# Patient Record
Sex: MALE | Race: WHITE | NOT HISPANIC OR LATINO | Employment: UNEMPLOYED | URBAN - METROPOLITAN AREA
[De-identification: names, ages, dates, MRNs, and addresses within clinical notes are randomized per-mention and may not be internally consistent; named-entity substitution may affect disease eponyms.]

---

## 2020-06-14 ENCOUNTER — HOSPITAL ENCOUNTER (EMERGENCY)
Facility: HOSPITAL | Age: 25
Discharge: HOME | End: 2020-06-14
Attending: EMERGENCY MEDICINE | Admitting: EMERGENCY MEDICINE

## 2020-06-14 ENCOUNTER — APPOINTMENT (OUTPATIENT)
Dept: GENERAL RADIOLOGY | Facility: HOSPITAL | Age: 25
End: 2020-06-14

## 2020-06-14 VITALS
DIASTOLIC BLOOD PRESSURE: 68 MMHG | HEART RATE: 75 BPM | BODY MASS INDEX: 19.29 KG/M2 | TEMPERATURE: 98.9 F | RESPIRATION RATE: 16 BRPM | SYSTOLIC BLOOD PRESSURE: 107 MMHG | WEIGHT: 120 LBS | OXYGEN SATURATION: 97 % | HEIGHT: 66 IN

## 2020-06-14 DIAGNOSIS — R09.1 PLEURISY: Primary | ICD-10-CM

## 2020-06-14 LAB
ALBUMIN SERPL-MCNC: 4.6 G/DL (ref 3.5–5.2)
ALBUMIN/GLOB SERPL: 1.9 G/DL
ALP SERPL-CCNC: 56 U/L (ref 39–117)
ALT SERPL W P-5'-P-CCNC: 12 U/L (ref 1–41)
ANION GAP SERPL CALCULATED.3IONS-SCNC: 10.6 MMOL/L (ref 5–15)
AST SERPL-CCNC: 17 U/L (ref 1–40)
BASOPHILS # BLD AUTO: 0.09 10*3/MM3 (ref 0–0.2)
BASOPHILS NFR BLD AUTO: 1.1 % (ref 0–1.5)
BILIRUB SERPL-MCNC: 0.2 MG/DL (ref 0.2–1.2)
BUN BLD-MCNC: 10 MG/DL (ref 6–20)
BUN/CREAT SERPL: 11.8 (ref 7–25)
CALCIUM SPEC-SCNC: 9.2 MG/DL (ref 8.6–10.5)
CHLORIDE SERPL-SCNC: 102 MMOL/L (ref 98–107)
CO2 SERPL-SCNC: 28.4 MMOL/L (ref 22–29)
CREAT BLD-MCNC: 0.85 MG/DL (ref 0.76–1.27)
D DIMER PPP FEU-MCNC: <0.27 MCGFEU/ML (ref 0–0.46)
DEPRECATED RDW RBC AUTO: 41.1 FL (ref 37–54)
EOSINOPHIL # BLD AUTO: 0.25 10*3/MM3 (ref 0–0.4)
EOSINOPHIL NFR BLD AUTO: 3.1 % (ref 0.3–6.2)
ERYTHROCYTE [DISTWIDTH] IN BLOOD BY AUTOMATED COUNT: 13.1 % (ref 12.3–15.4)
GFR SERPL CREATININE-BSD FRML MDRD: 110 ML/MIN/1.73
GLOBULIN UR ELPH-MCNC: 2.4 GM/DL
GLUCOSE BLD-MCNC: 100 MG/DL (ref 65–99)
HCT VFR BLD AUTO: 39.7 % (ref 37.5–51)
HGB BLD-MCNC: 12.9 G/DL (ref 13–17.7)
IMM GRANULOCYTES # BLD AUTO: 0.03 10*3/MM3 (ref 0–0.05)
IMM GRANULOCYTES NFR BLD AUTO: 0.4 % (ref 0–0.5)
LYMPHOCYTES # BLD AUTO: 3.29 10*3/MM3 (ref 0.7–3.1)
LYMPHOCYTES NFR BLD AUTO: 40.6 % (ref 19.6–45.3)
MCH RBC QN AUTO: 28.2 PG (ref 26.6–33)
MCHC RBC AUTO-ENTMCNC: 32.5 G/DL (ref 31.5–35.7)
MCV RBC AUTO: 86.9 FL (ref 79–97)
MONOCYTES # BLD AUTO: 0.88 10*3/MM3 (ref 0.1–0.9)
MONOCYTES NFR BLD AUTO: 10.9 % (ref 5–12)
NEUTROPHILS # BLD AUTO: 3.57 10*3/MM3 (ref 1.7–7)
NEUTROPHILS NFR BLD AUTO: 43.9 % (ref 42.7–76)
NRBC BLD AUTO-RTO: 0 /100 WBC (ref 0–0.2)
PLATELET # BLD AUTO: 197 10*3/MM3 (ref 140–450)
PMV BLD AUTO: 10.1 FL (ref 6–12)
POTASSIUM BLD-SCNC: 4 MMOL/L (ref 3.5–5.2)
PROT SERPL-MCNC: 7 G/DL (ref 6–8.5)
RBC # BLD AUTO: 4.57 10*6/MM3 (ref 4.14–5.8)
SODIUM BLD-SCNC: 141 MMOL/L (ref 136–145)
TROPONIN T SERPL-MCNC: <0.01 NG/ML (ref 0–0.03)
VALPROATE SERPL-MCNC: 41.4 MCG/ML (ref 50–125)
WBC NRBC COR # BLD: 8.11 10*3/MM3 (ref 3.4–10.8)

## 2020-06-14 PROCEDURE — 84484 ASSAY OF TROPONIN QUANT: CPT | Performed by: EMERGENCY MEDICINE

## 2020-06-14 PROCEDURE — 80164 ASSAY DIPROPYLACETIC ACD TOT: CPT | Performed by: EMERGENCY MEDICINE

## 2020-06-14 PROCEDURE — 99284 EMERGENCY DEPT VISIT MOD MDM: CPT | Performed by: EMERGENCY MEDICINE

## 2020-06-14 PROCEDURE — 85379 FIBRIN DEGRADATION QUANT: CPT | Performed by: EMERGENCY MEDICINE

## 2020-06-14 PROCEDURE — 71046 X-RAY EXAM CHEST 2 VIEWS: CPT

## 2020-06-14 PROCEDURE — 80053 COMPREHEN METABOLIC PANEL: CPT | Performed by: EMERGENCY MEDICINE

## 2020-06-14 PROCEDURE — 99283 EMERGENCY DEPT VISIT LOW MDM: CPT

## 2020-06-14 PROCEDURE — 93005 ELECTROCARDIOGRAM TRACING: CPT | Performed by: EMERGENCY MEDICINE

## 2020-06-14 PROCEDURE — 85025 COMPLETE CBC W/AUTO DIFF WBC: CPT | Performed by: EMERGENCY MEDICINE

## 2020-06-14 PROCEDURE — 93010 ELECTROCARDIOGRAM REPORT: CPT | Performed by: INTERNAL MEDICINE

## 2020-06-14 RX ORDER — TRAZODONE HYDROCHLORIDE 50 MG/1
50 TABLET ORAL NIGHTLY
COMMUNITY

## 2020-06-14 RX ORDER — DIVALPROEX SODIUM 500 MG/1
500 TABLET, DELAYED RELEASE ORAL NIGHTLY
COMMUNITY
End: 2020-07-16

## 2020-06-14 RX ORDER — SODIUM CHLORIDE 0.9 % (FLUSH) 0.9 %
10 SYRINGE (ML) INJECTION AS NEEDED
Status: DISCONTINUED | OUTPATIENT
Start: 2020-06-14 | End: 2020-06-14 | Stop reason: HOSPADM

## 2020-06-15 NOTE — ED NOTES
Patient eploed and in parking lot with IV in place.  When asked pt if he had an IV he said no.  I asked him to pull up his sleeves and there was an IV in his rt AC.  I escorted pt into the emergency room and removed his IV.     Tiffanie Emery  06/14/20 6837

## 2020-06-15 NOTE — ED PROVIDER NOTES
Subjective     History provided by:  Patient    History of Present Illness    · Chief complaint: Chest pain    · Location: Substernal nonradiating    · Quality/Severity: Sharp and throbbing and moderately intense.    · Timing/Onset: Started 4 days ago.    · Modifying Factors: Breathing exacerbates the pain.  Applying pressure to his chest wall improves the pain.    · Associated symptoms: He states that it hurts to breathe and he short of breath.    · Narrative: The patient is a 25-year-old white male complaining of substernal chest pain for 4 days.  The patient states the [facility] placed him on psychiatric medications Desyrel,  Zoloft and Depakote which he believes is causing the chest discomfort.  He is unemployed and smokes a pack per day.  He denies a history of IV drug abuse.  He states he only drug he is never abused is been marijuana remotely.    Review of Systems   Constitutional: Negative for activity change, appetite change, chills, diaphoresis, fatigue and fever.   HENT: Negative for congestion, dental problem, ear pain, hearing loss, mouth sores, postnasal drip, rhinorrhea, sinus pressure, sore throat and voice change.    Eyes: Negative for photophobia, pain, discharge, redness and visual disturbance.   Respiratory: Positive for shortness of breath. Negative for cough, chest tightness, wheezing and stridor.    Cardiovascular: Positive for chest pain. Negative for palpitations and leg swelling.   Gastrointestinal: Negative for abdominal pain, diarrhea, nausea and vomiting.   Genitourinary: Negative for difficulty urinating, dysuria, flank pain, frequency, hematuria and urgency.   Musculoskeletal: Negative for arthralgias, back pain, gait problem, joint swelling, myalgias, neck pain and neck stiffness.   Skin: Negative for color change and rash.   Neurological: Negative for dizziness, tremors, seizures, syncope, facial asymmetry, speech difficulty, weakness, light-headedness, numbness and headaches.  "  Hematological: Negative for adenopathy.   Psychiatric/Behavioral: Negative.  Negative for confusion and decreased concentration. The patient is not nervous/anxious.      Past Medical History:   Diagnosis Date   • Suicidal ideation      /68 (BP Location: Left arm, Patient Position: Lying)   Pulse 75   Temp 98.9 °F (37.2 °C) (Oral)   Resp 16   Ht 167.6 cm (66\")   Wt 54.4 kg (120 lb)   SpO2 97%   BMI 19.37 kg/m²     Past Medical History:   Diagnosis Date   • Suicidal ideation        No Known Allergies    History reviewed. No pertinent surgical history.    History reviewed. No pertinent family history.    Social History     Socioeconomic History   • Marital status: Significant Other     Spouse name: Not on file   • Number of children: Not on file   • Years of education: Not on file   • Highest education level: Not on file   Tobacco Use   • Smoking status: Current Every Day Smoker     Packs/day: 0.50     Types: Cigarettes, Electronic Cigarette   • Smokeless tobacco: Never Used   Substance and Sexual Activity   • Alcohol use: Not Currently   • Drug use: Not Currently           Objective   Physical Exam   Constitutional: He is oriented to person, place, and time. He appears well-developed and well-nourished. No distress.   The patient has a thin body build.  He appears in no acute distress.  Review of his vital signs: He is afebrile and all his vital signs are within normal limits.   HENT:   Head: Normocephalic and atraumatic.   Nose: Nose normal.   Mouth/Throat: Oropharynx is clear and moist. No oropharyngeal exudate.   Eyes: Pupils are equal, round, and reactive to light. EOM are normal. Right eye exhibits no discharge. Left eye exhibits no discharge. No scleral icterus.   Neck: Normal range of motion. Neck supple. No JVD present. No thyromegaly present.   Cardiovascular: Normal rate, regular rhythm and normal heart sounds.   No murmur heard.  Pulmonary/Chest: Effort normal and breath sounds normal. He has " no wheezes. He has no rales. He exhibits no tenderness.   Applying pressure to his chest wall improves his chest discomfort.   Abdominal: Soft. Bowel sounds are normal. He exhibits no distension. There is no tenderness.   Musculoskeletal: Normal range of motion. He exhibits no edema, tenderness or deformity.   Lymphadenopathy:     He has no cervical adenopathy.   Neurological: He is alert and oriented to person, place, and time. No cranial nerve deficit. Coordination normal.   No focal motor sensory deficit   Skin: Skin is warm and dry. Capillary refill takes less than 2 seconds. No rash noted. He is not diaphoretic.   Psychiatric: He has a normal mood and affect. His behavior is normal. Judgment and thought content normal.   Nursing note and vitals reviewed.      Procedures           ED Course  ED Course as of Jun 14 2234   Sun Jun 14, 2020 2035 My interpretation of the patient's EKG tracing performed 20:23 is normal sinus rhythm with a rate of 63, normal axis, normal conduction, no acute ST segment elevation or depression consistent with ischemia, no ectopy, normal EKG.    [TP]   2110 The patient's chest x-ray was interpreted by me and the radiologist as normal.  Cardiac troponin negative.  D-dimer negative.  Valproic acid level is subtherapeutic at 41.4.  CBC was normal.  CMP was normal.    [TP]   2231 21:10 the patient walked out of the ER without telling anybody just before I was able to discuss with him his test results.  The tech ran after him and caught him in the parking lot and asked him if he still had an IV in place.  The patient told the tech he did not.  When she lifted the sleeve on his arm she found the IV still in place.  The patient was brought back to the emergency department where the IV was removed.  I informed him of his test results being negative for acute pathology.  Is my impression the patient's chest pain is due to pleurisy.  I counseled him to stop smoking.  He refused my offer for a  prescription for an NSAID.    [TP]      ED Course User Index  [TP] Pablito Giles MD                                           MDM  Number of Diagnoses or Management Options  Pleurisy: new and requires workup     Amount and/or Complexity of Data Reviewed  Clinical lab tests: ordered and reviewed  Tests in the radiology section of CPT®: ordered and reviewed  Tests in the medicine section of CPT®: ordered and reviewed  Independent visualization of images, tracings, or specimens: yes    Risk of Complications, Morbidity, and/or Mortality  Presenting problems: high  Diagnostic procedures: high  Management options: high  General comments: My differential diagnosis for chest pain includes but is not limited to:  Muscle strain, costochondritis, myositis, pleurisy, rib fracture, intercostal neuritis, herpes zoster, tumor, myocardial infarction, coronary syndrome, unstable angina, angina, aortic dissection, mitral valve prolapse, pericarditis, palpitations, pulmonary embolus, pneumonia, pneumothorax, lung cancer, GERD, esophagitis, esophageal spasm    Patient Progress  Patient progress: stable      Final diagnoses:   Pleurisy           Labs Reviewed   COMPREHENSIVE METABOLIC PANEL - Abnormal; Notable for the following components:       Result Value    Glucose 100 (*)     All other components within normal limits    Narrative:     GFR Normal >60  Chronic Kidney Disease <60  Kidney Failure <15     CBC WITH AUTO DIFFERENTIAL - Abnormal; Notable for the following components:    Hemoglobin 12.9 (*)     Lymphocytes, Absolute 3.29 (*)     All other components within normal limits   VALPROIC ACID LEVEL, TOTAL - Abnormal; Notable for the following components:    Valproic Acid 41.4 (*)     All other components within normal limits    Narrative:     Therapeutic Ranges for Valproic Acid    Epilepsy:       mcg/ml  Bipolar/[place]  up to 125 mcg/ml     D-DIMER, QUANTITATIVE - Normal    Narrative:     Can be elevated in, but is not  diagnostic for deep vein thrombosis (DVT) or pulmonary embolis (PE).  It is also elevated in other medical conditions.  Clinical correlation is required.  The negative cut-off value for the D-Dimer is 0.50 mcg FEU/mL for DVT and PE.     TROPONIN (IN-HOUSE) - Normal    Narrative:     Troponin T Reference Range:  <= 0.03 ng/mL-   Negative for AMI  >0.03 ng/mL-     Abnormal for myocardial necrosis.  Clinicians would have to utilize clinical acumen, EKG, Troponin and serial changes to determine if it is an Acute Myocardial Infarction or myocardial injury due to an underlying chronic condition.       Results may be falsely decreased if patient taking Biotin.     CBC AND DIFFERENTIAL    Narrative:     The following orders were created for panel order CBC & Differential.  Procedure                               Abnormality         Status                     ---------                               -----------         ------                     CBC Auto Differential[[id number]]        Abnormal            Final result                 Please view results for these tests on the individual orders.     XR Chest 2 View   ED Interpretation   No acute cardiopulmonary findings.      Signer Name: Manuel Marshall MD    Signed: 6/14/2020 8:48 PM    Workstation Name: [facility]"[email or web address]     [facility]      Final Result   No acute cardiopulmonary findings.      Signer Name: Manuel Marshall MD    Signed: 6/14/2020 8:48 PM    Workstation Name: [facility]"[email or web address]     [facility]             Medication List      No changes were made to your prescriptions during this visit.            Pablito Giles MD  06/14/20 1869

## 2020-07-16 ENCOUNTER — HOSPITAL ENCOUNTER (EMERGENCY)
Facility: HOSPITAL | Age: 25
Discharge: HOME | End: 2020-07-16
Attending: EMERGENCY MEDICINE | Admitting: EMERGENCY MEDICINE

## 2020-07-16 VITALS
WEIGHT: 120 LBS | DIASTOLIC BLOOD PRESSURE: 77 MMHG | HEART RATE: 86 BPM | HEIGHT: 66 IN | OXYGEN SATURATION: 97 % | TEMPERATURE: 98.9 F | RESPIRATION RATE: 16 BRPM | SYSTOLIC BLOOD PRESSURE: 113 MMHG | BODY MASS INDEX: 19.29 KG/M2

## 2020-07-16 DIAGNOSIS — K08.89 PAIN, DENTAL: Primary | ICD-10-CM

## 2020-07-16 PROCEDURE — 99283 EMERGENCY DEPT VISIT LOW MDM: CPT

## 2020-07-16 PROCEDURE — 99282 EMERGENCY DEPT VISIT SF MDM: CPT | Performed by: EMERGENCY MEDICINE

## 2020-07-16 RX ORDER — ESCITALOPRAM OXALATE 10 MG/1
10 TABLET ORAL DAILY
COMMUNITY

## 2020-07-16 RX ORDER — HYDROXYZINE HYDROCHLORIDE 25 MG/1
25 TABLET, FILM COATED ORAL EVERY 6 HOURS PRN
COMMUNITY

## 2020-07-16 RX ORDER — HYDROCODONE BITARTRATE AND ACETAMINOPHEN 5; 325 MG/1; MG/1
1 TABLET ORAL ONCE
Status: COMPLETED | OUTPATIENT
Start: 2020-07-16 | End: 2020-07-16

## 2020-07-16 RX ADMIN — HYDROCODONE BITARTRATE AND ACETAMINOPHEN 1 TABLET: 5; 325 TABLET ORAL at 00:56

## 2020-07-16 NOTE — ED NOTES
"Pt states that he \"went to the dentist to get teeth pulled, dentist broke 1 tooth and cut the other in half.\" Pt on antibiotics and takes NSAIDS for pain control.  Pt states \"the pain is so bad I cant stand it. I can feel the pain all the way down my neck\"     Alfredo Cifuentes  07/16/20 0007    "

## 2020-07-16 NOTE — ED PROVIDER NOTES
Subjective   History of Present Illness  History of Present Illness    Chief complaint: Toothache    Location: Left lower tooth    Quality/Severity: Moderate to severe dull aching pain    Timing/Duration: Present for 2 weeks, worsening tonight    Modifying Factors: Worse with eating    Narrative: This patient presents for evaluation of worsening pain to the left lower tooth area.  He apparently has been dealing with a toothache problem for 2 weeks now.  He went to the [facility] dental clinic a couple of weeks ago and according to the patient, they tried to work on his tooth but it caused a crack in the tooth.  They put him on amoxicillin antibiotic for 1 week which she has not completed.  Patient has been waiting for a referral to an endodontist to have the tooth pulled.  He has not heard anything from them yet.  Tonight, the pain was increasing so he came here.  He has not had any fevers.  He says the pain feels like it is radiating up towards the ear and into the neck area.  It hurts when he eats or tries to swallow.    Associated Symptoms: As above    Review of Systems   Constitutional: Negative for activity change, diaphoresis and fever.   HENT: Positive for dental problem. Negative for drooling, facial swelling, hearing loss, mouth sores, sore throat and voice change.    Respiratory: Negative for cough and shortness of breath.    Cardiovascular: Negative for chest pain.   Skin: Negative for color change and rash.   Neurological: Positive for headaches. Negative for syncope.   All other systems reviewed and are negative.      Past Medical History:   Diagnosis Date   • Suicidal ideation    • Suicide attempt (CMS/[facility])        No Known Allergies    History reviewed. No pertinent surgical history.    History reviewed. No pertinent family history.    Social History     Socioeconomic History   • Marital status: Significant Other     Spouse name: Not on file   • Number of children: Not on file   • Years of education:  Not on file   • Highest education level: Not on file   Tobacco Use   • Smoking status: Current Every Day Smoker     Packs/day: 1.00     Types: Cigarettes, Electronic Cigarette   • Smokeless tobacco: Never Used   Substance and Sexual Activity   • Alcohol use: Not Currently   • Drug use: Yes     Types: Marijuana     Comment: last time- 3 weeks ago   • Sexual activity: Defer       ED Triage Vitals [07/16/20 0004]   Temp Heart Rate Resp BP SpO2   98.9 °F (37.2 °C) 86 16 113/77 97 %      Temp src Heart Rate Source Patient Position BP Location FiO2 (%)   Oral Monitor Sitting Right arm --         Objective   Physical Exam   Constitutional: He is oriented to person, place, and time. He appears well-developed and well-nourished. No distress.   HENT:   Head: Normocephalic and atraumatic.   Right Ear: External ear normal.   Left Ear: External ear normal.   Nose: Nose normal.   Poor dentition noted.  The left lower first molar does indeed show clear and obvious signs of carious disease with a fracture component towards the lateral aspect.  This is very tender to manipulation.  The surrounding gingival structures are inflamed.  No bleeding notable.  No significant facial soft tissue swelling is evident.  No sublingual swelling is evident either.  No facial erythema apparent.  No cervical adenopathy palpable.   Eyes: Pupils are equal, round, and reactive to light. EOM are normal. Right eye exhibits no discharge. Left eye exhibits no discharge.   Neck: Normal range of motion. Neck supple.   Cardiovascular: Normal rate and regular rhythm.   Pulmonary/Chest: Effort normal. No respiratory distress.   Musculoskeletal: Normal range of motion. He exhibits no edema or deformity.   Neurological: He is alert and oriented to person, place, and time. He exhibits normal muscle tone.   Skin: Skin is warm and dry. No rash noted. He is not diaphoretic. No erythema.   Psychiatric: He has a normal mood and affect. His behavior is normal. Judgment  "and thought content normal.   Nursing note and vitals reviewed.      Procedures           ED Course  ED Course as of Jul 16 0202   u Jul 16, 2020 0201 Patient presented for dental pain.  His physical exam was not worrisome for any emergency conditions.  I gave him 1 Norco tablet.  I urged him to contact the dental clinic in the morning to expedite his referral process.  I saw no need to restart another round of antibiotics on him right now.  I reviewed with him the usual \"return to ER\" instructions were any worsening signs or symptoms.  He was discharged home in good condition after that.    [VETO]      ED Course User Index  [VETO] Manuel Francois MD                                           [facility]    Final diagnoses:   Pain, dental            Manuel Francois MD  07/16/20 0202    "

## 2020-07-16 NOTE — DISCHARGE INSTRUCTIONS
Must follow-up with your dentist for further evaluation and treatment.  Please return to the emergency room for any worsening pain, fevers, swelling, difficulty swallowing or any other concerns.

## 2020-07-18 ENCOUNTER — HOSPITAL ENCOUNTER (EMERGENCY)
Facility: HOSPITAL | Age: 25
Discharge: HOME | End: 2020-07-18
Attending: EMERGENCY MEDICINE | Admitting: EMERGENCY MEDICINE

## 2020-07-18 VITALS
TEMPERATURE: 98.3 F | RESPIRATION RATE: 18 BRPM | DIASTOLIC BLOOD PRESSURE: 69 MMHG | OXYGEN SATURATION: 97 % | HEIGHT: 66 IN | BODY MASS INDEX: 19.29 KG/M2 | SYSTOLIC BLOOD PRESSURE: 104 MMHG | HEART RATE: 95 BPM | WEIGHT: 120 LBS

## 2020-07-18 DIAGNOSIS — K08.89 PAIN, DENTAL: Primary | ICD-10-CM

## 2020-07-18 PROCEDURE — 99282 EMERGENCY DEPT VISIT SF MDM: CPT

## 2020-07-18 PROCEDURE — 99282 EMERGENCY DEPT VISIT SF MDM: CPT | Performed by: EMERGENCY MEDICINE

## 2020-07-18 RX ORDER — PENICILLIN V POTASSIUM 500 MG/1
500 TABLET ORAL 4 TIMES DAILY
Qty: 40 TABLET | Refills: 0 | Status: SHIPPED | OUTPATIENT
Start: 2020-07-18

## 2020-07-19 NOTE — ED PROVIDER NOTES
Subjective   History of Present Illness  History of Present Illness    Chief complaint: Toothache    Location: Left lower mandible    Quality/Severity: Moderate    Timing/Onset/Duration: X2 weeks    Modifying Factors: Nothing makes it better    Associated Symptoms: No fever or chills.  No difficulty swallowing.  No shortness of breath.    Narrative: This 25-year-old white male presents with toothache for the last 2 weeks.  He was seen here on July 16 by Dr. Francois.  He was given a a hydrocodone and diagnosed with toothache.      Review of Systems   Constitutional: Negative for chills and fever.   HENT: Negative for trouble swallowing.         Dental pain   Respiratory: Negative for shortness of breath.         Medication List      ASK your doctor about these medications    escitalopram 10 MG tablet  Commonly known as:  LEXAPRO     hydrOXYzine 25 MG tablet  Commonly known as:  ATARAX     traZODone 50 MG tablet  Commonly known as:  DESYREL          Past Medical History:   Diagnosis Date   • Suicidal ideation    • Suicide attempt (CMS/[facility])        No Known Allergies    No past surgical history on file.    No family history on file.    Social History     Socioeconomic History   • Marital status: Significant Other     Spouse name: Not on file   • Number of children: Not on file   • Years of education: Not on file   • Highest education level: Not on file   Tobacco Use   • Smoking status: Current Every Day Smoker     Packs/day: 1.00     Types: Cigarettes, Electronic Cigarette   • Smokeless tobacco: Never Used   Substance and Sexual Activity   • Alcohol use: Not Currently   • Drug use: Yes     Types: Marijuana     Comment: last time- 3 weeks ago   • Sexual activity: Defer           Objective   Physical Exam   Constitutional: He is oriented to person, place, and time. He appears well-developed and well-nourished. He appears distressed.   ED Triage Vitals (07/18/20 2010)  Temp: 98.3 °F (36.8 °C)  Heart Rate: 95  Resp: 18  BP:  104/69  SpO2: 97 %  Temp src: Oral  Heart Rate Source: Monitor  Patient Position: Sitting  BP Location: Left arm  FiO2 (%): n/a    The patient's vitals were reviewed by me.  Unless otherwise noted they are within normal limits.     HENT:   There is tenderness upon palpation of the left lower molar.  There is no fluctuance or drainage.  There is no trismus.  There is no tonsillar swelling or uvular swelling or deviation.   Pulmonary/Chest: Effort normal.   Neurological: He is alert and oriented to person, place, and time.   Nursing note and vitals reviewed.      Procedures           ED Course      20:30, 07/18/20:  The patient's diagnosis of dental pain was discussed with him.  I will write the patient a prescription for penicillin.  He should take Tylenol or Motrin as needed as directed for pain.  He should follow-up with the immediate dental clinic tomorrow.  He states he is going to [facility] dental clinic on Monday.  Should return if there is difficulty breathing, fever, difficulty swallowing, worse in any way at all.  All the patient's questions were answered he will be discharged in good condition.                                     MDM  No orders to display     Labs Reviewed - No data to display  No results found.    Final diagnoses:   Pain, dental         ED Medications:  Medications - No data to display    New Medications:     Medication List      ASK your doctor about these medications    escitalopram 10 MG tablet  Commonly known as:  LEXAPRO     hydrOXYzine 25 MG tablet  Commonly known as:  ATARAX     traZODone 50 MG tablet  Commonly known as:  DESYREL          Stopped Medications:     Medication List      ASK your doctor about these medications    escitalopram 10 MG tablet  Commonly known as:  LEXAPRO     hydrOXYzine 25 MG tablet  Commonly known as:  ATARAX     traZODone 50 MG tablet  Commonly known as:  DESYREL            Final diagnoses:   Pain, dental            Johnny Estrada MD  07/18/20 2032